# Patient Record
Sex: FEMALE | Race: WHITE | NOT HISPANIC OR LATINO | Employment: FULL TIME | URBAN - METROPOLITAN AREA
[De-identification: names, ages, dates, MRNs, and addresses within clinical notes are randomized per-mention and may not be internally consistent; named-entity substitution may affect disease eponyms.]

---

## 2017-01-10 ENCOUNTER — ALLSCRIPTS OFFICE VISIT (OUTPATIENT)
Dept: OTHER | Facility: OTHER | Age: 37
End: 2017-01-10

## 2017-01-11 LAB
HPV 18 (HISTORICAL): NOT DETECTED
HPV HIGH RISK 16/18 (HISTORICAL): NOT DETECTED
HPV16 (HISTORICAL): NOT DETECTED
PAP (HISTORICAL): NORMAL

## 2017-08-10 ENCOUNTER — APPOINTMENT (OUTPATIENT)
Dept: LAB | Facility: CLINIC | Age: 37
End: 2017-08-10
Payer: COMMERCIAL

## 2017-08-10 DIAGNOSIS — E13.9 MATURITY ONSET DIABETES MELLITUS IN YOUNG (HCC): Primary | ICD-10-CM

## 2017-08-10 LAB
ALBUMIN SERPL BCP-MCNC: 3.6 G/DL (ref 3.5–5)
ALP SERPL-CCNC: 52 U/L (ref 46–116)
ALT SERPL W P-5'-P-CCNC: 30 U/L (ref 12–78)
ANION GAP SERPL CALCULATED.3IONS-SCNC: 6 MMOL/L (ref 4–13)
AST SERPL W P-5'-P-CCNC: 19 U/L (ref 5–45)
BILIRUB SERPL-MCNC: 0.45 MG/DL (ref 0.2–1)
BUN SERPL-MCNC: 10 MG/DL (ref 5–25)
CALCIUM SERPL-MCNC: 8.6 MG/DL (ref 8.3–10.1)
CHLORIDE SERPL-SCNC: 105 MMOL/L (ref 100–108)
CHOLEST SERPL-MCNC: 186 MG/DL (ref 50–200)
CO2 SERPL-SCNC: 28 MMOL/L (ref 21–32)
CREAT SERPL-MCNC: 0.85 MG/DL (ref 0.6–1.3)
ERYTHROCYTE [DISTWIDTH] IN BLOOD BY AUTOMATED COUNT: 12.2 % (ref 11.6–15.1)
EST. AVERAGE GLUCOSE BLD GHB EST-MCNC: 183 MG/DL
GFR SERPL CREATININE-BSD FRML MDRD: 88 ML/MIN/1.73SQ M
GLUCOSE P FAST SERPL-MCNC: 181 MG/DL (ref 65–99)
HBA1C MFR BLD: 8 % (ref 4.2–6.3)
HCT VFR BLD AUTO: 40.8 % (ref 34.8–46.1)
HDLC SERPL-MCNC: 46 MG/DL (ref 40–60)
HGB BLD-MCNC: 13.9 G/DL (ref 11.5–15.4)
LDLC SERPL CALC-MCNC: 106 MG/DL (ref 0–100)
MCH RBC QN AUTO: 31.6 PG (ref 26.8–34.3)
MCHC RBC AUTO-ENTMCNC: 34.1 G/DL (ref 31.4–37.4)
MCV RBC AUTO: 93 FL (ref 82–98)
PLATELET # BLD AUTO: 204 THOUSANDS/UL (ref 149–390)
PMV BLD AUTO: 11 FL (ref 8.9–12.7)
POTASSIUM SERPL-SCNC: 4.2 MMOL/L (ref 3.5–5.3)
PROT SERPL-MCNC: 7.1 G/DL (ref 6.4–8.2)
RBC # BLD AUTO: 4.4 MILLION/UL (ref 3.81–5.12)
SODIUM SERPL-SCNC: 139 MMOL/L (ref 136–145)
TRIGL SERPL-MCNC: 171 MG/DL
WBC # BLD AUTO: 7.06 THOUSAND/UL (ref 4.31–10.16)

## 2017-08-10 PROCEDURE — 83036 HEMOGLOBIN GLYCOSYLATED A1C: CPT

## 2017-08-10 PROCEDURE — 36415 COLL VENOUS BLD VENIPUNCTURE: CPT

## 2017-08-10 PROCEDURE — 85027 COMPLETE CBC AUTOMATED: CPT

## 2017-08-10 PROCEDURE — 80053 COMPREHEN METABOLIC PANEL: CPT

## 2017-08-10 PROCEDURE — 80061 LIPID PANEL: CPT

## 2018-01-13 VITALS
HEIGHT: 69 IN | BODY MASS INDEX: 33.47 KG/M2 | SYSTOLIC BLOOD PRESSURE: 146 MMHG | WEIGHT: 226 LBS | DIASTOLIC BLOOD PRESSURE: 94 MMHG

## 2018-02-27 ENCOUNTER — APPOINTMENT (OUTPATIENT)
Dept: LAB | Facility: CLINIC | Age: 38
End: 2018-02-27
Payer: COMMERCIAL

## 2018-02-27 DIAGNOSIS — E13.9 MATURITY ONSET DIABETES MELLITUS IN YOUNG (HCC): Primary | ICD-10-CM

## 2018-02-27 LAB
EST. AVERAGE GLUCOSE BLD GHB EST-MCNC: 183 MG/DL
HBA1C MFR BLD: 8 % (ref 4.2–6.3)

## 2018-02-27 PROCEDURE — 36415 COLL VENOUS BLD VENIPUNCTURE: CPT

## 2018-02-27 PROCEDURE — 83036 HEMOGLOBIN GLYCOSYLATED A1C: CPT

## 2018-04-24 ENCOUNTER — ANNUAL EXAM (OUTPATIENT)
Dept: OBGYN CLINIC | Facility: CLINIC | Age: 38
End: 2018-04-24
Payer: COMMERCIAL

## 2018-04-24 VITALS
HEIGHT: 69 IN | DIASTOLIC BLOOD PRESSURE: 94 MMHG | BODY MASS INDEX: 34.51 KG/M2 | SYSTOLIC BLOOD PRESSURE: 142 MMHG | WEIGHT: 233 LBS

## 2018-04-24 DIAGNOSIS — N89.8 VAGINAL ITCHING: ICD-10-CM

## 2018-04-24 DIAGNOSIS — N91.2 AMENORRHEA: ICD-10-CM

## 2018-04-24 DIAGNOSIS — Z01.419 ENCOUNTER FOR GYNECOLOGICAL EXAMINATION WITHOUT ABNORMAL FINDING: Primary | ICD-10-CM

## 2018-04-24 PROCEDURE — 99395 PREV VISIT EST AGE 18-39: CPT | Performed by: PHYSICIAN ASSISTANT

## 2018-04-24 RX ORDER — GLIMEPIRIDE 1 MG/1
1 TABLET ORAL
COMMUNITY
Start: 2017-09-05 | End: 2019-05-22 | Stop reason: ALTCHOICE

## 2018-04-24 NOTE — PROGRESS NOTES
Assessment/Plan   Diagnoses and all orders for this visit:    Encounter for gynecological examination without abnormal finding    Amenorrhea  -     Progesterone; Future  -     hCG, quantitative; Future  -     TSH, 3rd generation; Future  -     T4, free; Future    Vaginal itching  -     GP Culture, Genital    Other orders  -     glimepiride (AMARYL) 1 mg tablet; Take 1 mg by mouth        Discussion  I have discussed the importance of monthly self-breast exams, exercise and healthy diet as well as adequate intake of calcium and vitamin D  Encourage MVI q day and r/gera importance of folic acid; Encourage 30-40 min weight bearing exercise most days of week  Encourage safe sexual practices; STD testing -   Contraception -   The current ASCCP guidelines were reviewed  The low risk patient will receive pap smear screening every 3 years or pap with HPV co-testing every 5 years  High risk patients will be triaged based on previous pap smear results, which have been reviewed; I emphasized the importance of an annual pelvic and breast exam  Patient opts to have a pap done today  Breast cancer screening is not indicated at this time  All questions have been answered to her satisfaction  RTO for APE or sooner if needed      Subjective     Pt for annual GYN exam  Periods sporadic  Last period approx 7 mths ago from what she can remember, very light bleeding  R/w pt concerns w oligomenorrhea  Periods were normal initially after delivery then fell off and now very sporadic  Pt does have h/o PCOS  Does c/o worsening hirsuitism lately  Does c/o vaginal itching and irritation off and on, itching is significant  Does currently have sx  Uses vaginal cream once a day and sx resolve, then recur few weeks later          Ramo Hernandez is a 40 y o  female who presents for annual well woman exam    Menarche -15 ; LMP -few mths ago 10/17  (+) SBEs - no breast masses, asymmetry, nipple discharge or bleeding, changes in skin of breast, or breast tenderness bilaterally  No abd/pelvic pain or HAs;   Pt is sexually active in a mutually monog/ sexual relationship; No issues with intercourse; She declines std/hiv/hep testing; Feels safe at home  Current contraception: partner w male sterility  (+) PCP for routine Bw/care; Last Pap -  WNL neg HPV deferred today  History of abnormal Pap smear:     Review of Systems   Constitutional: Negative  Respiratory: Negative  Gastrointestinal: Negative  Endocrine: Negative  Genitourinary: Positive for menstrual problem  The following portions of the patient's history were reviewed and updated as appropriate: current medications, past family history, past medical history, past social history, past surgical history and problem list          OB History      Para Term  AB Living    3 3            SAB TAB Ectopic Multiple Live Births                     Obstetric Comments    C/s x1 ; 2009-LTCS secondary nuchal cord ; complicated [id number]; [id number]-[id number]-C/E, A2GDM and pregnancy comments: 2008-cf negative           Past Medical History:   Diagnosis Date    Diabetes type 2, controlled ([address] )     Oligomenorrhea     PCOS (polycystic ovarian syndrome)        Past Surgical History:   Procedure Laterality Date     SECTION, LOW TRANSVERSE      WISDOM TOOTH EXTRACTION         Family History   Problem Relation Age of Onset    Stroke Mother     Hypertension Mother     Diabetes Paternal Grandmother     Lung cancer Paternal Grandmother        Social History     Social History    Marital status: /Civil Union     Spouse name: N/A    Number of children: N/A    Years of education: N/A     Occupational History    Not on file       Social History Main Topics    Smoking status: Former Smoker    Smokeless tobacco: Not on file    Alcohol use Yes      Comment: social     Drug use: Unknown    Sexual activity: Not on file     Other Topics Concern    Not on file Social History Narrative    Monogamous relationship          Current Outpatient Prescriptions:     glimepiride (AMARYL) 1 mg tablet, Take 1 mg by mouth, Disp: , Rfl:     Allergies   Allergen Reactions    Naproxen        Objective   Vitals:    04/24/18 1114   BP: 142/94   BP Location: Left arm   Patient Position: Sitting   Cuff Size: Adult   Weight: 106 kg (233 lb)   Height: 5' 9" (1 753 m)     Physical Exam   Constitutional: She is oriented to person, place, and time  She appears well-developed and well-nourished  HENT:   Head: Normocephalic and atraumatic  Cardiovascular: Normal rate and regular rhythm  Pulmonary/Chest: Effort normal and breath sounds normal  Right breast exhibits no inverted nipple, no mass, no nipple discharge, no skin change and no tenderness  Left breast exhibits no inverted nipple, no mass, no nipple discharge, no skin change and no tenderness  Breasts are symmetrical    Abdominal: Soft  She exhibits no distension and no mass  There is no rebound and no guarding  Genitourinary: No breast swelling, tenderness, discharge or bleeding  There is erythema in the vagina  Vaginal discharge found  Genitourinary Comments: Moderate amount of external erythema noted around rim of labia majora and mostly around superior vagina  No lesions noted, no excoriations but moderate erythema noted and some patches of hypopigmentation  C/w LSA    Neurological: She is alert and oriented to person, place, and time  Skin: Skin is warm and dry  Psychiatric: She has a normal mood and affect  Patient Instructions   Will plan BW to evaluate for ovulation  If no signs of ovulation will plan Provera withdrawal   Pt then desires to watch over the next few mths and see what happens w cycles  If no menses after 3 mths will redo Provera withdrawal and consider other options to make cycles more regular and minimize PCOS sx

## 2018-04-24 NOTE — PATIENT INSTRUCTIONS
Will plan BW to evaluate for ovulation  If no signs of ovulation will plan Provera withdrawal   Pt then desires to watch over the next few mths and see what happens w cycles  If no menses after 3 mths will redo Provera withdrawal and consider other options to make cycles more regular and minimize PCOS sx

## 2018-04-27 ENCOUNTER — APPOINTMENT (OUTPATIENT)
Dept: LAB | Facility: CLINIC | Age: 38
End: 2018-04-27
Payer: COMMERCIAL

## 2018-04-27 DIAGNOSIS — N91.2 AMENORRHEA: ICD-10-CM

## 2018-04-27 LAB
B-HCG SERPL-ACNC: <2 MIU/ML
PROGEST SERPL-MCNC: 0.3 NG/ML
SL AMB GENITAL CULTURE: NORMAL
T4 FREE SERPL-MCNC: 1.04 NG/DL (ref 0.76–1.46)
TSH SERPL DL<=0.05 MIU/L-ACNC: 1.71 UIU/ML (ref 0.36–3.74)

## 2018-04-27 PROCEDURE — 84702 CHORIONIC GONADOTROPIN TEST: CPT

## 2018-04-27 PROCEDURE — 84439 ASSAY OF FREE THYROXINE: CPT

## 2018-04-27 PROCEDURE — 84144 ASSAY OF PROGESTERONE: CPT

## 2018-04-27 PROCEDURE — 36415 COLL VENOUS BLD VENIPUNCTURE: CPT

## 2018-04-27 PROCEDURE — 84443 ASSAY THYROID STIM HORMONE: CPT

## 2018-05-01 DIAGNOSIS — N91.5 OLIGOMENORRHEA, UNSPECIFIED TYPE: Primary | ICD-10-CM

## 2018-05-01 RX ORDER — MEDROXYPROGESTERONE ACETATE 10 MG/1
10 TABLET ORAL DAILY
Qty: 10 TABLET | Refills: 0 | Status: SHIPPED | OUTPATIENT
Start: 2018-05-01 | End: 2019-05-22 | Stop reason: ALTCHOICE

## 2018-05-17 ENCOUNTER — TELEPHONE (OUTPATIENT)
Dept: OBGYN CLINIC | Facility: CLINIC | Age: 38
End: 2018-05-17

## 2018-05-17 NOTE — TELEPHONE ENCOUNTER
Spoke with patient  Stated she took Provera then started bleeding 2 days ago which bleeding was normal first day then the last two days bleeding was really heavy  Going through a super plus tampon every 1-2 hours  Was calling to see if normal? Reviewed may have heavier menses due to no menses for 7 months and withdraw bleed from provera  Advised can try 600mg Motrin Q 6 hours to help with heavy bleeding or can start on a birth control pill  When reviewing risks of clotting with OCP due to patient age and elevated blood pressure patient declined at this time and stated she will try the Motrin first to see if that works  Pt denies any lightheadedness or dizziness  Reviewed with patient if bleeding gets worse or she does not feel better to call us back or head to the ER  Pt states will continue to evaluate

## 2018-06-13 ENCOUNTER — TELEPHONE (OUTPATIENT)
Dept: OBGYN CLINIC | Facility: CLINIC | Age: 38
End: 2018-06-13

## 2018-06-21 NOTE — TELEPHONE ENCOUNTER
Spoke with patient about bleeding  Pt states took Provera like Jose Miller had prescribed to bring on menses as had not had menses in over 6 months  Began bleeding day after stopped provera  Bleeding lasted 7-8 days with 4-5 days of very heavy menses  Bleeding stopped for a whole week  Then had spotting until she started her current menses this week  Pt states was sent for lab work and was told all WNL  Does have a h/o PCOS   Options reviewed by Jose Miller to help with hirsutism and irregular menses  OCP discussed per patient  Pt would like to see if bleeding regulates on its own if not would like to go on OCP  Reviewed with patient will speak with Jose Miller prior to prescribing due to hx of HTN not currently on any medication, and unsure how far Jose Miller had discussed OCP with patient

## 2018-06-29 NOTE — TELEPHONE ENCOUNTER
LMOM was going to review with patient recommendation of cyclic progesterone, and pt to see endocrine to discuss options for hirsutism treatment

## 2018-07-05 NOTE — TELEPHONE ENCOUNTER
Spoke with patient recommend following up with endocrine for treatment for hirsutism  Advised cyclic progesterone to allow her to have regular monthly cycles  Pt states she recently had a menses on her own on June 15th but is now having some spotting in between  This is the second month this has happened  Reviewed with patient she does not need cyclic progesterone if she is getting menses on her own  Recommend continuing to follow menses and if continues to have intermenstrual bleeding to call office for follow up

## 2019-01-10 NOTE — TELEPHONE ENCOUNTER
Would you have time to call and review s/sx with her  I dont know a lot about prometrium to help bring on period   thanks no

## 2019-05-22 ENCOUNTER — ANNUAL EXAM (OUTPATIENT)
Dept: OBGYN CLINIC | Facility: CLINIC | Age: 39
End: 2019-05-22
Payer: COMMERCIAL

## 2019-05-22 VITALS
SYSTOLIC BLOOD PRESSURE: 120 MMHG | WEIGHT: 209 LBS | HEIGHT: 69 IN | DIASTOLIC BLOOD PRESSURE: 80 MMHG | BODY MASS INDEX: 30.96 KG/M2

## 2019-05-22 DIAGNOSIS — N92.1 MENORRHAGIA WITH IRREGULAR CYCLE: ICD-10-CM

## 2019-05-22 DIAGNOSIS — Z01.419 ENCOUNTER FOR GYNECOLOGICAL EXAMINATION WITHOUT ABNORMAL FINDING: Primary | ICD-10-CM

## 2019-05-22 PROCEDURE — 99395 PREV VISIT EST AGE 18-39: CPT | Performed by: PHYSICIAN ASSISTANT

## 2019-05-22 RX ORDER — LISINOPRIL 5 MG/1
TABLET ORAL
Refills: 1 | COMMUNITY
Start: 2019-05-15

## 2019-05-22 RX ORDER — EMPAGLIFLOZIN 25 MG/1
TABLET, FILM COATED ORAL
Refills: 0 | COMMUNITY
Start: 2019-05-10 | End: 2021-05-19

## 2019-05-22 RX ORDER — ATORVASTATIN CALCIUM 20 MG/1
20 TABLET, FILM COATED ORAL EVERY EVENING
Refills: 0 | COMMUNITY
Start: 2019-03-12

## 2019-05-22 RX ORDER — ACARBOSE 25 MG/1
TABLET ORAL
Refills: 1 | COMMUNITY
Start: 2019-05-12

## 2019-05-26 LAB — SL AMB GENITAL CULTURE: ABNORMAL

## 2019-06-01 ENCOUNTER — HOSPITAL ENCOUNTER (OUTPATIENT)
Dept: RADIOLOGY | Facility: HOSPITAL | Age: 39
Discharge: HOME | End: 2019-06-01
Payer: COMMERCIAL

## 2019-06-01 DIAGNOSIS — N92.1 MENORRHAGIA WITH IRREGULAR CYCLE: ICD-10-CM

## 2019-06-01 PROCEDURE — 76856 US EXAM PELVIC COMPLETE: CPT

## 2019-06-01 PROCEDURE — 76830 TRANSVAGINAL US NON-OB: CPT

## 2019-06-04 LAB
BASOPHILS # BLD AUTO: 0.1 X10E3/UL (ref 0–0.2)
BASOPHILS NFR BLD AUTO: 1 %
EOSINOPHIL # BLD AUTO: 0.2 X10E3/UL (ref 0–0.4)
EOSINOPHIL NFR BLD AUTO: 2 %
ERYTHROCYTE [DISTWIDTH] IN BLOOD BY AUTOMATED COUNT: 12.6 % (ref 12.3–15.4)
HCT VFR BLD AUTO: 43.2 % (ref 34–46.6)
HGB BLD-MCNC: 14.4 G/DL (ref 11.1–15.9)
IMM GRANULOCYTES # BLD: 0 X10E3/UL (ref 0–0.1)
IMM GRANULOCYTES NFR BLD: 0 %
LYMPHOCYTES # BLD AUTO: 1.9 X10E3/UL (ref 0.7–3.1)
LYMPHOCYTES NFR BLD AUTO: 26 %
MCH RBC QN AUTO: 31.4 PG (ref 26.6–33)
MCHC RBC AUTO-ENTMCNC: 33.3 G/DL (ref 31.5–35.7)
MCV RBC AUTO: 94 FL (ref 79–97)
MONOCYTES # BLD AUTO: 0.6 X10E3/UL (ref 0.1–0.9)
MONOCYTES NFR BLD AUTO: 8 %
NEUTROPHILS # BLD AUTO: 4.7 X10E3/UL (ref 1.4–7)
NEUTROPHILS NFR BLD AUTO: 63 %
PLATELET # BLD AUTO: 222 X10E3/UL (ref 150–450)
RBC # BLD AUTO: 4.58 X10E6/UL (ref 3.77–5.28)
WBC # BLD AUTO: 7.5 X10E3/UL (ref 3.4–10.8)

## 2019-06-20 ENCOUNTER — TELEPHONE (OUTPATIENT)
Dept: OBGYN CLINIC | Facility: CLINIC | Age: 39
End: 2019-06-20

## 2019-07-18 ENCOUNTER — OFFICE VISIT (OUTPATIENT)
Dept: OBGYN CLINIC | Facility: CLINIC | Age: 39
End: 2019-07-18
Payer: COMMERCIAL

## 2019-07-18 VITALS
BODY MASS INDEX: 30.45 KG/M2 | SYSTOLIC BLOOD PRESSURE: 112 MMHG | WEIGHT: 205.6 LBS | DIASTOLIC BLOOD PRESSURE: 78 MMHG | HEIGHT: 69 IN

## 2019-07-18 DIAGNOSIS — N92.0 MENORRHAGIA WITH REGULAR CYCLE: Primary | ICD-10-CM

## 2019-07-18 PROBLEM — Z01.419 ENCOUNTER FOR GYNECOLOGICAL EXAMINATION WITHOUT ABNORMAL FINDING: Status: RESOLVED | Noted: 2018-04-24 | Resolved: 2019-07-18

## 2019-07-18 PROCEDURE — 99214 OFFICE O/P EST MOD 30 MIN: CPT | Performed by: OBSTETRICS & GYNECOLOGY

## 2019-07-18 RX ORDER — NORETHINDRONE ACETATE AND ETHINYL ESTRADIOL AND FERROUS FUMARATE 1MG-20(24)
1 KIT ORAL DAILY
Qty: 84 TABLET | Refills: 3 | Status: SHIPPED | OUTPATIENT
Start: 2019-07-18 | End: 2020-06-29

## 2019-07-18 NOTE — PROGRESS NOTES
Assessment/Plan:    Menorrhagia - reviewed options for treatment - will try OCP  RTO 3 months for OCP check - earlier prn       Problem List Items Addressed This Visit        Other    Menorrhagia with regular cycle - Primary    Relevant Medications    norethindrone-ethinyl estradiol-ferrous fumarate (LOESTIN 24 FE) 1-20 MG-MCG(24) per tablet            Subjective:      Patient ID: Shawanda Finch is a 45 y o  female  Here to discuss possible hysterectomy - menses are regular but very heavy for 2-3 days for total of 10 days with spotting up to a week before - no dysmenorrhea - has not tried any other forms of medication - reviewed ultrasound findings with her, but since she has absolutely no dysmenorrhea I am suspicious of the adenomyosis diagnosis on the ultrasound  And a sure that with her  We discussed all options available for treatment of the menorrhagia and she is interested in trying an oral contraceptive pill at this time  We reviewed the risks and benefits associated with that and reviewed how to take the pill  We sent her prescription that she will start with her next menses  She will return to the office in 2 and half to 3 months for follow-up, earlier as needed  The following portions of the patient's history were reviewed and updated as appropriate:   She  has a past medical history of Diabetes type 2, controlled ([address] ), Oligomenorrhea, and PCOS (polycystic ovarian syndrome)  She   Patient Active Problem List    Diagnosis Date Noted    Menorrhagia with regular cycle 2019    Type II or unspecified type diabetes mellitus without mention of complication, not stated as uncontrolled 12/10/2013     She  has a past surgical history that includes  section, low transverse and [place] tooth extraction  Her family history includes Diabetes in her paternal grandmother; Hypertension in her mother; Lung cancer in her paternal grandmother; Stroke in her mother    She  reports that she has quit smoking  She does not have any smokeless tobacco history on file  She reports that she drinks alcohol  She reports that she does not use drugs  Current Outpatient Medications   Medication Sig Dispense Refill    acarbose (PRECOSE) 25 mg tablet   1    atorvastatin (LIPITOR) 20 mg tablet Take 20 mg by mouth every evening  0    JARDIANCE 25 MG TABS   0    lisinopril (ZESTRIL) 5 mg tablet   1    norethindrone-ethinyl estradiol-ferrous fumarate (LOESTIN 24 FE) 1-20 MG-MCG(24) per tablet Take 1 tablet by mouth daily for 84 days 84 tablet 3     No current facility-administered medications for this visit  Current Outpatient Medications on File Prior to Visit   Medication Sig    acarbose (PRECOSE) 25 mg tablet     atorvastatin (LIPITOR) 20 mg tablet Take 20 mg by mouth every evening    JARDIANCE 25 MG TABS     lisinopril (ZESTRIL) 5 mg tablet      No current facility-administered medications on file prior to visit  She is allergic to naproxen       Review of Systems   Constitutional: Negative for chills, fatigue, fever and unexpected weight change  HENT: Negative for dental problem, sinus pressure and sinus pain  Eyes: Negative for visual disturbance  Respiratory: Negative for cough, shortness of breath and wheezing  Cardiovascular: Negative for chest pain and leg swelling  Gastrointestinal: Negative for constipation, diarrhea, nausea and vomiting  Genitourinary: Negative for menstrual problem, pelvic pain and urgency  Musculoskeletal: Negative for back pain  Allergic/Immunologic: Negative for environmental allergies  Neurological: Negative for dizziness and headaches           Objective:      /78 (BP Location: Left arm, Patient Position: Sitting, Cuff Size: Standard)   Ht 5' 9" (1 753 m)   Wt 93 3 kg (205 lb 9 6 oz)   LMP 06/27/2019 (Approximate) Comment: GIUSEPPE discussion today   BMI 30 36 kg/m²          Physical Exam   Constitutional: She is oriented to person, place, and time  She appears well-developed and well-nourished  No distress  [facility] white female   HENT:   Head: Normocephalic and atraumatic  Neurological: She is alert and oriented to person, place, and time  Psychiatric: She has a normal mood and affect

## 2019-10-17 ENCOUNTER — OFFICE VISIT (OUTPATIENT)
Dept: OBGYN CLINIC | Facility: CLINIC | Age: 39
End: 2019-10-17
Payer: COMMERCIAL

## 2019-10-17 VITALS
DIASTOLIC BLOOD PRESSURE: 74 MMHG | BODY MASS INDEX: 29.18 KG/M2 | SYSTOLIC BLOOD PRESSURE: 122 MMHG | HEIGHT: 69 IN | WEIGHT: 197 LBS

## 2019-10-17 DIAGNOSIS — N92.0 MENORRHAGIA WITH REGULAR CYCLE: ICD-10-CM

## 2019-10-17 DIAGNOSIS — Z30.41 ENCOUNTER FOR SURVEILLANCE OF CONTRACEPTIVE PILLS: Primary | ICD-10-CM

## 2019-10-17 PROCEDURE — 99213 OFFICE O/P EST LOW 20 MIN: CPT | Performed by: OBSTETRICS & GYNECOLOGY

## 2019-10-17 NOTE — PROGRESS NOTES
Assessment/Plan:    No problem-specific Assessment & Plan notes found for this encounter  Problem List Items Addressed This Visit        Other    Menorrhagia with regular cycle    Encounter for surveillance of contraceptive pills - Primary            Subjective:      Patient ID: Ronna Scott is a 45 y o  female  Here for OCP check  - menses are regular and slightly lighter and less cramping - no problems otherwise and would like to stay on the current pill  The following portions of the patient's history were reviewed and updated as appropriate:   She  has a past medical history of Diabetes type 2, controlled ([address] ), Oligomenorrhea, and PCOS (polycystic ovarian syndrome)  She   Patient Active Problem List    Diagnosis Date Noted    Encounter for surveillance of contraceptive pills 10/17/2019    Menorrhagia with regular cycle 2019    Type II or unspecified type diabetes mellitus without mention of complication, not stated as uncontrolled 12/10/2013     She  has a past surgical history that includes  section, low transverse and [place] tooth extraction  Her family history includes Diabetes in her paternal grandmother; Hypertension in her mother; Lung cancer in her paternal grandmother; Stroke in her mother  She  reports that she has quit smoking  She has never used smokeless tobacco  She reports that she drinks alcohol  She reports that she does not use drugs  Current Outpatient Medications   Medication Sig Dispense Refill    acarbose (PRECOSE) 25 mg tablet   1    atorvastatin (LIPITOR) 20 mg tablet Take 20 mg by mouth every evening  0    JARDIANCE 25 MG TABS   0    lisinopril (ZESTRIL) 5 mg tablet   1    norethindrone-ethinyl estradiol-ferrous fumarate (LOESTIN 24 FE) 1-20 MG-MCG(24) per tablet Take 1 tablet by mouth daily for 84 days 84 tablet 3     No current facility-administered medications for this visit        Current Outpatient Medications on File Prior to Visit Medication Sig    acarbose (PRECOSE) 25 mg tablet     atorvastatin (LIPITOR) 20 mg tablet Take 20 mg by mouth every evening    JARDIANCE 25 MG TABS     lisinopril (ZESTRIL) 5 mg tablet     norethindrone-ethinyl estradiol-ferrous fumarate (LOESTIN 24 FE) 1-20 MG-MCG(24) per tablet Take 1 tablet by mouth daily for 84 days     No current facility-administered medications on file prior to visit  She is allergic to naproxen       Review of Systems   Constitutional: Negative for chills, fatigue, fever and unexpected weight change  HENT: Negative for dental problem, sinus pressure and sinus pain  Eyes: Negative for visual disturbance  Respiratory: Negative for cough, shortness of breath and wheezing  Cardiovascular: Negative for chest pain and leg swelling  Gastrointestinal: Negative for constipation, diarrhea, nausea and vomiting  Genitourinary: Negative for urgency  Musculoskeletal: Negative for back pain and joint swelling  Allergic/Immunologic: Negative for environmental allergies  Neurological: Negative for dizziness and headaches  Psychiatric/Behavioral: The patient is not nervous/anxious  Objective:      /74 (BP Location: Left arm, Patient Position: Sitting, Cuff Size: Standard)   Ht 5' 9" (1 753 m)   Wt 89 4 kg (197 lb)   LMP 10/01/2019 (Exact Date)   BMI 29 09 kg/m²          Physical Exam   Constitutional: She is oriented to person, place, and time  She appears well-developed and well-nourished  No distress  Young white female    Neurological: She is alert and oriented to person, place, and time  Psychiatric: She has a normal mood and affect  Vitals reviewed

## 2020-06-29 DIAGNOSIS — N92.0 MENORRHAGIA WITH REGULAR CYCLE: ICD-10-CM

## 2020-06-29 RX ORDER — NORETHINDRONE ACETATE/ETHINYL ESTRADIOL AND FERROUS FUMARATE 1MG-20(24)
KIT ORAL
Qty: 84 TABLET | Refills: 3 | Status: SHIPPED | OUTPATIENT
Start: 2020-06-29 | End: 2021-03-08 | Stop reason: SDUPTHER

## 2021-03-08 ENCOUNTER — TELEPHONE (OUTPATIENT)
Dept: OBGYN CLINIC | Facility: CLINIC | Age: 41
End: 2021-03-08

## 2021-03-08 DIAGNOSIS — N92.0 MENORRHAGIA WITH REGULAR CYCLE: ICD-10-CM

## 2021-03-09 RX ORDER — NORETHINDRONE ACETATE/ETHINYL ESTRADIOL AND FERROUS FUMARATE 1MG-20(24)
1 KIT ORAL DAILY
Qty: 84 TABLET | Refills: 0 | Status: SHIPPED | OUTPATIENT
Start: 2021-03-09 | End: 2021-05-19 | Stop reason: SDUPTHER

## 2021-05-19 ENCOUNTER — ANNUAL EXAM (OUTPATIENT)
Dept: OBGYN CLINIC | Facility: CLINIC | Age: 41
End: 2021-05-19
Payer: COMMERCIAL

## 2021-05-19 VITALS
SYSTOLIC BLOOD PRESSURE: 136 MMHG | DIASTOLIC BLOOD PRESSURE: 84 MMHG | HEIGHT: 69 IN | BODY MASS INDEX: 31.37 KG/M2 | WEIGHT: 211.8 LBS

## 2021-05-19 DIAGNOSIS — Z12.31 ENCOUNTER FOR SCREENING MAMMOGRAM FOR MALIGNANT NEOPLASM OF BREAST: ICD-10-CM

## 2021-05-19 DIAGNOSIS — Z01.419 ROUTINE GYNECOLOGICAL EXAMINATION: Primary | ICD-10-CM

## 2021-05-19 DIAGNOSIS — Z13.220 SCREENING FOR LIPID DISORDERS: ICD-10-CM

## 2021-05-19 DIAGNOSIS — N92.0 MENORRHAGIA WITH REGULAR CYCLE: ICD-10-CM

## 2021-05-19 DIAGNOSIS — E28.2 PCOS (POLYCYSTIC OVARIAN SYNDROME): ICD-10-CM

## 2021-05-19 DIAGNOSIS — E11.9 TYPE 2 DIABETES MELLITUS WITHOUT COMPLICATION, WITHOUT LONG-TERM CURRENT USE OF INSULIN (HCC): ICD-10-CM

## 2021-05-19 PROBLEM — I10 ESSENTIAL HYPERTENSION: Status: ACTIVE | Noted: 2020-03-31

## 2021-05-19 PROBLEM — E78.5 HYPERLIPIDEMIA: Status: ACTIVE | Noted: 2020-03-31

## 2021-05-19 PROCEDURE — S0612 ANNUAL GYNECOLOGICAL EXAMINA: HCPCS | Performed by: PHYSICIAN ASSISTANT

## 2021-05-19 RX ORDER — NORETHINDRONE ACETATE/ETHINYL ESTRADIOL AND FERROUS FUMARATE 1MG-20(24)
1 KIT ORAL DAILY
Qty: 84 TABLET | Refills: 3 | Status: SHIPPED | OUTPATIENT
Start: 2021-05-19 | End: 2022-05-24 | Stop reason: ALTCHOICE

## 2021-05-22 LAB
CYTOLOGIST CVX/VAG CYTO: ABNORMAL
DX ICD CODE: ABNORMAL
HPV I/H RISK 4 DNA CVX QL PROBE+SIG AMP: POSITIVE
HPV16 DNA CVX QL PROBE+SIG AMP: NEGATIVE
HPV18+45 E6+E7 MRNA CVX QL NAA+PROBE: NEGATIVE
OTHER STN SPEC: ABNORMAL
PATH REPORT.FINAL DX SPEC: ABNORMAL
SL AMB NOTE:: ABNORMAL
SL AMB SPECIMEN ADEQUACY: ABNORMAL
SL AMB TEST METHODOLOGY: ABNORMAL

## 2021-05-26 LAB
ALBUMIN SERPL-MCNC: 4 G/DL (ref 3.8–4.8)
ALBUMIN/GLOB SERPL: 1.5 {RATIO} (ref 1.2–2.2)
ALP SERPL-CCNC: 42 IU/L (ref 48–121)
ALT SERPL-CCNC: 31 IU/L (ref 0–32)
AST SERPL-CCNC: 37 IU/L (ref 0–40)
BASOPHILS # BLD AUTO: 0.1 X10E3/UL (ref 0–0.2)
BASOPHILS NFR BLD AUTO: 1 %
BILIRUB SERPL-MCNC: 0.4 MG/DL (ref 0–1.2)
BUN SERPL-MCNC: 14 MG/DL (ref 6–24)
BUN/CREAT SERPL: 18 (ref 9–23)
CALCIUM SERPL-MCNC: 8.9 MG/DL (ref 8.7–10.2)
CHLORIDE SERPL-SCNC: 105 MMOL/L (ref 96–106)
CHOLEST SERPL-MCNC: 130 MG/DL (ref 100–199)
CHOLEST/HDLC SERPL: 2.6 RATIO (ref 0–4.4)
CO2 SERPL-SCNC: 23 MMOL/L (ref 20–29)
CREAT SERPL-MCNC: 0.77 MG/DL (ref 0.57–1)
EOSINOPHIL # BLD AUTO: 0.2 X10E3/UL (ref 0–0.4)
EOSINOPHIL NFR BLD AUTO: 3 %
ERYTHROCYTE [DISTWIDTH] IN BLOOD BY AUTOMATED COUNT: 12.1 % (ref 11.7–15.4)
EST. AVERAGE GLUCOSE BLD GHB EST-MCNC: 174 MG/DL
GLOBULIN SER-MCNC: 2.6 G/DL (ref 1.5–4.5)
GLUCOSE SERPL-MCNC: 199 MG/DL (ref 65–99)
HBA1C MFR BLD: 7.7 % (ref 4.8–5.6)
HCT VFR BLD AUTO: 39.5 % (ref 34–46.6)
HDLC SERPL-MCNC: 50 MG/DL
HGB BLD-MCNC: 13.9 G/DL (ref 11.1–15.9)
IMM GRANULOCYTES # BLD: 0 X10E3/UL (ref 0–0.1)
IMM GRANULOCYTES NFR BLD: 0 %
LDLC SERPL CALC-MCNC: 54 MG/DL (ref 0–99)
LYMPHOCYTES # BLD AUTO: 1.8 X10E3/UL (ref 0.7–3.1)
LYMPHOCYTES NFR BLD AUTO: 21 %
MCH RBC QN AUTO: 32.5 PG (ref 26.6–33)
MCHC RBC AUTO-ENTMCNC: 35.2 G/DL (ref 31.5–35.7)
MCV RBC AUTO: 92 FL (ref 79–97)
MONOCYTES # BLD AUTO: 0.8 X10E3/UL (ref 0.1–0.9)
MONOCYTES NFR BLD AUTO: 9 %
NEUTROPHILS # BLD AUTO: 5.7 X10E3/UL (ref 1.4–7)
NEUTROPHILS NFR BLD AUTO: 66 %
PLATELET # BLD AUTO: 222 X10E3/UL (ref 150–450)
POTASSIUM SERPL-SCNC: 5 MMOL/L (ref 3.5–5.2)
PROT SERPL-MCNC: 6.6 G/DL (ref 6–8.5)
RBC # BLD AUTO: 4.28 X10E6/UL (ref 3.77–5.28)
SL AMB EGFR AFRICAN AMERICAN: 112 ML/MIN/1.73
SL AMB EGFR NON AFRICAN AMERICAN: 97 ML/MIN/1.73
SL AMB VLDL CHOLESTEROL CALC: 26 MG/DL (ref 5–40)
SODIUM SERPL-SCNC: 138 MMOL/L (ref 134–144)
T4 FREE SERPL-MCNC: 1.22 NG/DL (ref 0.82–1.77)
TRIGL SERPL-MCNC: 156 MG/DL (ref 0–149)
TSH SERPL DL<=0.005 MIU/L-ACNC: 1.88 UIU/ML (ref 0.45–4.5)
WBC # BLD AUTO: 8.6 X10E3/UL (ref 3.4–10.8)

## 2021-05-28 RX ORDER — ATORVASTATIN CALCIUM 20 MG/1
20 TABLET, FILM COATED ORAL DAILY
COMMUNITY
Start: 2021-05-21 | End: 2021-06-20

## 2021-05-28 NOTE — PROGRESS NOTES
Assessment/Plan   Problem List Items Addressed This Visit        Other    Menorrhagia with regular cycle    Relevant Medications    norethindrone-ethinyl estradiol-ferrous fumarate (Margaux 24 FE) 1-20 MG-MCG(24) per tablet    Other Relevant Orders    CBC and differential (Completed)    T4, free (Completed)    TSH, 3rd generation (Completed)    Routine gynecological examination - Primary     Pap guidelines reviewed  Pap with HPV done today  Will plan to continue Margaux 24 Fe  Script renewed to pharmacy  Script for routine blood work given including TFTs, CBC, CMP, Lipid panel, and Hemoglobin A1C  Encouraged patient to also follow up with PCP for diabetes management  Reviewed mammogram guidelines  Script for mammogram given  Return to office for annual or as needed  Relevant Orders    IGP, Aptima HPV, Rfx 16/18,45 (Completed)      Other Visit Diagnoses     Encounter for screening mammogram for malignant neoplasm of breast        Relevant Orders    Mammo screening bilateral w 3d & cad    PCOS (polycystic ovarian syndrome)        Relevant Orders    CBC and differential (Completed)    Comprehensive metabolic panel (Completed)    Lipid panel (Completed)    T4, free (Completed)    TSH, 3rd generation (Completed)    Hemoglobin A1C (Completed)    Type 2 diabetes mellitus without complication, without long-term current use of insulin (HCC)        Relevant Orders    CBC and differential (Completed)    Comprehensive metabolic panel (Completed)    Lipid panel (Completed)    T4, free (Completed)    TSH, 3rd generation (Completed)    Hemoglobin A1C (Completed)    Screening for lipid disorders        Relevant Orders    Lipid panel (Completed)          Subjective:     Patient ID: Isaak Dick is a 36 y o  y o  female  HPI  35 yo seen for annual exam  Currently on Margaux 24 Fe  Menses regular, heavy at times  Hx of PCOS as well as type 2 diabetes currently on Acarbose   Has not had routine blood work done in a few years  Denies bowel or bladder issues  Last pap: 1/10/2017 NILM (-)HRHPV  Last mammogram: Has not had done yet  The following portions of the patient's history were reviewed and updated as appropriate:   She  has a past medical history of Diabetes type 2, controlled ([facility] [address] ), Oligomenorrhea, and PCOS (polycystic ovarian syndrome)  She   Patient Active Problem List    Diagnosis Date Noted    Routine gynecological examination 2021    Essential hypertension 2020    Hyperlipidemia 2020    Diabetes mellitus without complication ([facility] [address] )     Encounter for surveillance of contraceptive pills 10/17/2019    Menorrhagia with regular cycle 2019    Type II or unspecified type diabetes mellitus without mention of complication, not stated as uncontrolled 12/10/2013     She  has a past surgical history that includes  section, low transverse and [place] tooth extraction  Her family history includes Diabetes in her paternal grandmother; Hypertension in her mother; Lung cancer in her paternal grandmother; Stroke in her mother  She  reports that she has quit smoking  She has never used smokeless tobacco  She reports current alcohol use  She reports that she does not use drugs  Current Outpatient Medications   Medication Sig Dispense Refill    acarbose (PRECOSE) 25 mg tablet   1    atorvastatin (LIPITOR) 20 mg tablet Take 20 mg by mouth every evening  0    atorvastatin (LIPITOR) 20 mg tablet Take 20 mg by mouth daily      lisinopril (ZESTRIL) 5 mg tablet   1    norethindrone-ethinyl estradiol-ferrous fumarate (Margaux 24 FE) 1-20 MG-MCG(24) per tablet Take 1 tablet by mouth daily 84 tablet 3     No current facility-administered medications for this visit  She is allergic to naproxen       Menstrual History:  OB History        2    Para   2    Term   2            AB        Living   3       SAB        TAB        Ectopic        Multiple   1    Live Births   3 Obstetric Comments   C/s x1 ; 2009-LTCS secondary nuchal cord ; complicated [id number]; [id number]-R/W, A2GDM and pregnancy comments: 2008-cf negative               Patient's last menstrual period was 05/07/2021 (exact date)  Review of Systems   Constitutional: Negative for fatigue, fever and unexpected weight change  HENT: Negative for dental problem and sinus pressure  Eyes: Negative for visual disturbance  Respiratory: Negative for cough, shortness of breath and wheezing  Cardiovascular: Negative for chest pain  Gastrointestinal: Negative for abdominal pain, blood in stool, constipation, diarrhea, nausea and vomiting  Endocrine: Negative for polydipsia  Genitourinary: Negative for difficulty urinating, dyspareunia, dysuria, frequency, hematuria, pelvic pain and urgency  Musculoskeletal: Negative for arthralgias and back pain  Neurological: Negative for dizziness, seizures, light-headedness and headaches  Psychiatric/Behavioral: Negative for suicidal ideas  The patient is not nervous/anxious  Objective:  Vitals:    05/19/21 1054   BP: 136/84   BP Location: Left arm   Patient Position: Sitting   Cuff Size: Standard   Weight: 96 1 kg (211 lb 12 8 oz)   Height: 5' 9" (1 753 m)      Physical Exam  Constitutional:       Appearance: Normal appearance  She is well-developed  Genitourinary:      Vulva, urethra, bladder, vagina and uterus normal       No vulval condylomata, lesion, tenderness, ulcerations, Bartholin's cyst or rash noted  No signs of labial injury  No labial fusion  No inguinal adenopathy present in the right or left side  No urethral prolapse, pain, swelling, tenderness, caruncle, mass or diverticulum present  Bladder is not distended or tender  No signs of injury or lesions in the vagina  No vaginal discharge, erythema, tenderness or bleeding  No cervical motion tenderness, discharge or lesion        Uterus is not enlarged, tender, irregular or mobile  No uterine mass detected  No right or left adnexal mass present  Right adnexa not tender or full  Left adnexa not tender or full  HENT:      Head: Normocephalic and atraumatic  Neck:      Thyroid: No thyromegaly  Cardiovascular:      Rate and Rhythm: Normal rate and regular rhythm  Heart sounds: Normal heart sounds  No murmur  No friction rub  No gallop  Pulmonary:      Effort: Pulmonary effort is normal  No respiratory distress  Breath sounds: Normal breath sounds  No wheezing  Chest:      Breasts: Breasts are symmetrical          Right: Normal  No swelling, bleeding, inverted nipple, mass, nipple discharge, skin change or tenderness  Left: Normal  No swelling, bleeding, inverted nipple, mass, nipple discharge, skin change or tenderness  Abdominal:      General: There is no distension  Palpations: Abdomen is soft  There is no mass  Tenderness: There is no abdominal tenderness  There is no guarding or rebound  Hernia: No hernia is present  Lymphadenopathy:      Cervical: No cervical adenopathy  Upper Body:      Right upper body: No supraclavicular, axillary or pectoral adenopathy  Left upper body: No supraclavicular, axillary or pectoral adenopathy  Lower Body: No right inguinal adenopathy  No left inguinal adenopathy  Neurological:      Mental Status: She is alert and oriented to person, place, and time  Skin:     General: Skin is warm and dry     Psychiatric:         Behavior: Behavior normal

## 2021-06-01 PROBLEM — Z01.419 ROUTINE GYNECOLOGICAL EXAMINATION: Status: ACTIVE | Noted: 2021-06-01

## 2021-06-01 NOTE — ASSESSMENT & PLAN NOTE
Pap guidelines reviewed  Pap with HPV done today  Will plan to continue Margaux 24 Fe  Script renewed to pharmacy  Script for routine blood work given including TFTs, CBC, CMP, Lipid panel, and Hemoglobin A1C  Encouraged patient to also follow up with PCP for diabetes management  Reviewed mammogram guidelines  Script for mammogram given  Return to office for annual or as needed

## 2021-07-29 ENCOUNTER — TELEPHONE (OUTPATIENT)
Dept: OBGYN CLINIC | Facility: CLINIC | Age: 41
End: 2021-07-29

## 2021-07-29 NOTE — TELEPHONE ENCOUNTER
Pt has a new [address] [facility] in [address]  She said they do not carry her birth control and she wants to know if there is a different prescription she can take

## 2021-07-29 NOTE — TELEPHONE ENCOUNTER
Called pharmacist  Has medication in different generic form, just not the brand  Can get brand if she wants, just takes a few days to get in  Otherwise will fill generic  Patient is aware and open to generic   Aware pharmacist will fill for her and to follow up with them

## 2022-05-24 ENCOUNTER — ANNUAL EXAM (OUTPATIENT)
Dept: OBGYN CLINIC | Facility: CLINIC | Age: 42
End: 2022-05-24
Payer: COMMERCIAL

## 2022-05-24 VITALS
HEIGHT: 69 IN | DIASTOLIC BLOOD PRESSURE: 84 MMHG | WEIGHT: 203.6 LBS | SYSTOLIC BLOOD PRESSURE: 128 MMHG | BODY MASS INDEX: 30.16 KG/M2

## 2022-05-24 DIAGNOSIS — N92.0 MENORRHAGIA WITH REGULAR CYCLE: ICD-10-CM

## 2022-05-24 DIAGNOSIS — Z71.85 HPV VACCINE COUNSELING: ICD-10-CM

## 2022-05-24 DIAGNOSIS — Z12.31 ENCOUNTER FOR SCREENING MAMMOGRAM FOR MALIGNANT NEOPLASM OF BREAST: ICD-10-CM

## 2022-05-24 DIAGNOSIS — Z30.41 SURVEILLANCE FOR BIRTH CONTROL, ORAL CONTRACEPTIVES: ICD-10-CM

## 2022-05-24 DIAGNOSIS — Z01.419 ENCOUNTER FOR GYNECOLOGICAL EXAMINATION (GENERAL) (ROUTINE) WITHOUT ABNORMAL FINDINGS: Primary | ICD-10-CM

## 2022-05-24 PROCEDURE — 99396 PREV VISIT EST AGE 40-64: CPT | Performed by: PHYSICIAN ASSISTANT

## 2022-05-24 RX ORDER — ORAL SEMAGLUTIDE 7 MG/1
TABLET ORAL
COMMUNITY
Start: 2022-05-02

## 2022-05-24 RX ORDER — NORETHINDRONE ACETATE AND ETHINYL ESTRADIOL AND FERROUS FUMARATE 1MG-20(21)
KIT ORAL
COMMUNITY
Start: 2022-05-04 | End: 2022-05-24 | Stop reason: ALTCHOICE

## 2022-05-24 RX ORDER — NORETHINDRONE ACETATE/ETHINYL ESTRADIOL AND FERROUS FUMARATE 1MG-20(24)
1 KIT ORAL DAILY
Qty: 84 TABLET | Refills: 3 | Status: SHIPPED | OUTPATIENT
Start: 2022-05-24

## 2022-05-24 NOTE — ASSESSMENT & PLAN NOTE
The current ASCCP guidelines were reviewed  Patient's last pap was 5/19/21 WNL (-) ECC (+) HRHPV type 16/18 neg and therefore, a pap with HPV cotesting is indicated at this time  I emphasized the importance of an annual pelvic and breast exam  Patient ok to have a pap done today

## 2022-05-24 NOTE — PROGRESS NOTES
Assessment/Plan   Diagnoses and all orders for this visit:    Encounter for gynecological examination (general) (routine) without abnormal findings  -     IGP, Aptima HPV, Rfx 16/18,45  The current ASCCP guidelines were reviewed  Patient's last pap was 5/19/21 WNL (-) ECC (+) HRHPV type 16/18 neg and therefore, a pap with HPV cotesting is indicated at this time  I emphasized the importance of an annual pelvic and breast exam  Patient ok to have a pap done today  Encounter for screening mammogram for malignant neoplasm of breast  -     Mammo screening bilateral w 3d & cad; Future  A yearly mammogram is recommended for breast cancer screening starting at age 36;    Menorrhagia with regular cycle  -     norethindrone-ethinyl estradiol-ferrous fumarate (Margaux 24 FE) 1-20 MG-MCG(24) per tablet; Take 1 tablet by mouth in the morning  Surveillance for birth control, oral contraceptives  -     norethindrone-ethinyl estradiol-ferrous fumarate (Margaux 24 FE) 1-20 MG-MCG(24) per tablet; Take 1 tablet by mouth in the morning  Contraception - continue Margaux 24 Fe 1 tab po daily    HPV vaccine counseling  The patient has not had the Gardasil vaccine series, which is recommended for patients from 545 years of age  Reviewed and declines at this time  Discussion  Per patient PCP aware of OCP use with history of DM/BP/high cholesterol and ok to continue  BP well managed today  I have discussed the importance of monthly self-breast exams, exercise and healthy diet as well as adequate intake of calcium and vitamin D  Encourage MVI q day and r/gera importance of folic acid; Encourage 30-40 min weight bearing exercise most days of week  Encourage safe sexual practices; STI testing - declines  All questions have been answered to her satisfaction  RTO for APE or sooner if needed    Subjective     HPI   Maged Wakefield is a 39 y o  female who presents for annual well woman exam    Menarche - 15; LMP - 5/4/22;  Periods are reg q month and last 3-6 days; Some months barely any bleeding, however, the past 2 months period has been more heavy and crampy - has been on Loestrin 24 Fe from a good amount of time now and has worked well  Most recent pharmacy pick-up - given Microgestin Fe 1/20 because did not have Loestrin 24 Fe in stock for her so has switched this month but desires to switch back  No excessive bleeding; No intermenstrual bleeding or spotting; Cramps are tolerable  No vulvar itch/burn; No vaginal itch/burn; No abn discharge or odor; No urinary sx - burning/pain/frequency/hematuria  (+) SBEs - no breast masses, asymmetry, nipple discharge or bleeding, changes in skin of breast, or breast tenderness bilaterally  No abd/pelvic pain or HAs;   (+) menopausal symptoms: (+) hot flashes/night sweats - comes and goes; no problems with intercourse, vaginal dryness; sleeping well;   Pt is sexually active in a mutually monog/ sexual relationship; No issues with intercourse; She declines sti/hiv/hep testing; Feels safe at home  Current contraception: Margaux 24 Fe  Gardasil - not done  (+) PCP for routine Bw/care; Last Pap - 5/19/21 WNL (-) ECC (+) HRHPV type 16/18 neg  History of abnormal Pap smear: yes  Last mammo - none  History of abnormal mammogram:   Last colonoscopy - none    Review of Systems   Constitutional: Negative for activity change, fatigue, fever and unexpected weight change  HENT: Negative for congestion, dental problem, sinus pressure and sinus pain  Eyes: Negative for visual disturbance  Respiratory: Negative for cough, shortness of breath and wheezing  Cardiovascular: Negative for chest pain and leg swelling  Gastrointestinal: Negative for abdominal distention, abdominal pain, blood in stool, constipation, diarrhea, nausea and vomiting  Endocrine: Negative for polydipsia     Genitourinary: Negative for difficulty urinating, dyspareunia, dysuria, frequency, hematuria, menstrual problem, pelvic pain, urgency, vaginal bleeding, vaginal discharge and vaginal pain  Musculoskeletal: Negative for arthralgias and back pain  Allergic/Immunologic: Negative for environmental allergies  Neurological: Negative for dizziness, seizures and headaches  Psychiatric/Behavioral: Negative for dysphoric mood and sleep disturbance  The patient is not nervous/anxious          The following portions of the patient's history were reviewed and updated as appropriate: allergies, current medications, past family history, past medical history, past social history, past surgical history and problem list          OB History        2    Para   2    Term   2            AB        Living   3       SAB        IAB        Ectopic        Multiple   1    Live Births   3           Obstetric Comments   :  LTCS secondary nuchal cord; A2GDM;  repeat [id number]-[id number]-E/M, A2GDM  2008 - CF negative              Past Medical History:   Diagnosis Date    Diabetes type 2, controlled ([address] )     Hyperlipidemia     Hypertension     Oligomenorrhea     PCOS (polycystic ovarian syndrome)        Past Surgical History:   Procedure Laterality Date     SECTION, LOW TRANSVERSE      WISDOM TOOTH EXTRACTION         Family History   Problem Relation Age of Onset    Stroke Mother     Hypertension Mother     Diabetes Paternal Grandmother     Lung cancer Paternal Grandmother        Social History     Socioeconomic History    Marital status: /Civil Union     Spouse name: Not on file    Number of children: Not on file    Years of education: Not on file    Highest education level: Not on file   Occupational History    Not on file   Tobacco Use    Smoking status: Former Smoker    Smokeless tobacco: Never Used   Vaping Use    Vaping Use: Never used   Substance and Sexual Activity    Alcohol use: Yes     Comment: 2-3 a week    Drug use: Never    Sexual activity: Yes     Partners: Male     Birth control/protection: OCP Other Topics Concern    Not on file   Social History Narrative    Monogamous relationship      Social Determinants of Health     Financial Resource Strain: Not on file   Food Insecurity: Not on file   Transportation Needs: Not on file   Physical Activity: Not on file   Stress: Not on file   Social Connections: Not on file   Intimate Partner Violence: Not on file   Housing Stability: Not on file         Current Outpatient Medications:     acarbose (PRECOSE) 25 mg tablet, 3 x daily, Disp: , Rfl: 1    atorvastatin (LIPITOR) 20 mg tablet, Take 20 mg by mouth every evening, Disp: , Rfl: 0    lisinopril (ZESTRIL) 5 mg tablet, , Disp: , Rfl: 1    norethindrone-ethinyl estradiol-ferrous fumarate (Margaux 24 FE) 1-20 MG-MCG(24) per tablet, Take 1 tablet by mouth in the morning , Disp: 84 tablet, Rfl: 3    other medication, see sig,, Tess, Disp: , Rfl:     Rybelsus 7 MG TABS, , Disp: , Rfl:     atorvastatin (LIPITOR) 20 mg tablet, Take 20 mg by mouth daily, Disp: , Rfl:     Allergies   Allergen Reactions    Naproxen        Objective   Vitals:    05/24/22 0928   BP: 128/84   Weight: 92 4 kg (203 lb 9 6 oz)   Height: 5' 9" (1 753 m)     Physical Exam  Vitals reviewed  Constitutional:       General: She is awake  She is not in acute distress  Appearance: Normal appearance  She is well-developed and well-groomed  She is not ill-appearing, toxic-appearing or diaphoretic  HENT:      Head: Normocephalic and atraumatic  Eyes:      Conjunctiva/sclera: Conjunctivae normal    Neck:      Thyroid: No thyroid mass, thyromegaly or thyroid tenderness  Cardiovascular:      Rate and Rhythm: Normal rate and regular rhythm  Heart sounds: Normal heart sounds  No murmur heard  Pulmonary:      Effort: Pulmonary effort is normal  No tachypnea, bradypnea or respiratory distress  Breath sounds: Normal breath sounds  No stridor or decreased air movement  No wheezing     Chest:   Breasts: Breasts are symmetrical  Right: Normal  No swelling, bleeding, inverted nipple, mass, nipple discharge, skin change, tenderness, axillary adenopathy or supraclavicular adenopathy  Left: Normal  No swelling, bleeding, inverted nipple, mass, nipple discharge, skin change, tenderness, axillary adenopathy or supraclavicular adenopathy  Abdominal:      General: There is no distension  Palpations: Abdomen is soft  There is no hepatomegaly, splenomegaly or mass  Tenderness: There is no abdominal tenderness  Hernia: No hernia is present  There is no hernia in the left inguinal area or right inguinal area  Genitourinary:     General: Normal vulva  Exam position: Supine  Pubic Area: No rash or pubic lice  Labia:         Right: No rash, tenderness, lesion or injury  Left: No rash, tenderness, lesion or injury  Urethra: No prolapse, urethral pain, urethral swelling or urethral lesion  Vagina: Normal  No signs of injury and foreign body  No vaginal discharge, erythema, tenderness, bleeding, lesions or prolapsed vaginal walls  Cervix: No cervical motion tenderness, discharge, friability, lesion, erythema or cervical bleeding  Uterus: Not deviated, not enlarged, not fixed, not tender and no uterine prolapse  Adnexa:         Right: No mass, tenderness or fullness  Left: No mass, tenderness or fullness  Rectum: Normal  Guaiac result negative  No mass, tenderness, anal fissure, external hemorrhoid or internal hemorrhoid  Normal anal tone  Lymphadenopathy:      Cervical: No cervical adenopathy  Upper Body:      Right upper body: No supraclavicular or axillary adenopathy  Left upper body: No supraclavicular or axillary adenopathy  Lower Body: No right inguinal adenopathy  No left inguinal adenopathy  Skin:     General: Skin is warm and dry  Neurological:      Mental Status: She is alert and oriented to person, place, and time     Psychiatric: Mood and Affect: Mood and affect normal          Speech: Speech normal          Behavior: Behavior normal  Behavior is cooperative  Thought Content:  Thought content normal          Judgment: Judgment normal

## 2022-07-26 ENCOUNTER — HOSPITAL ENCOUNTER (OUTPATIENT)
Dept: RADIOLOGY | Facility: HOSPITAL | Age: 42
Discharge: HOME | End: 2022-07-26
Payer: COMMERCIAL

## 2022-07-26 VITALS — BODY MASS INDEX: 30.07 KG/M2 | HEIGHT: 69 IN | WEIGHT: 203 LBS

## 2022-07-26 DIAGNOSIS — Z12.31 ENCOUNTER FOR SCREENING MAMMOGRAM FOR MALIGNANT NEOPLASM OF BREAST: ICD-10-CM

## 2022-07-26 PROCEDURE — 77063 BREAST TOMOSYNTHESIS BI: CPT

## 2022-07-26 PROCEDURE — 77067 SCR MAMMO BI INCL CAD: CPT

## 2023-09-20 DIAGNOSIS — N92.0 MENORRHAGIA WITH REGULAR CYCLE: ICD-10-CM

## 2023-09-20 DIAGNOSIS — Z30.41 SURVEILLANCE FOR BIRTH CONTROL, ORAL CONTRACEPTIVES: ICD-10-CM

## 2023-09-20 RX ORDER — NORETHINDRONE ACETATE AND ETHINYL ESTRADIOL, AND FERROUS FUMARATE 1MG-20(24)
KIT ORAL
Qty: 84 TABLET | Refills: 0 | Status: SHIPPED | OUTPATIENT
Start: 2023-09-20

## 2023-09-29 NOTE — PROGRESS NOTES
Assessment/Plan   Problem List Items Addressed This Visit    None  Visit Diagnoses     Well woman exam    -  Primary    Relevant Orders    IGP, Aptima HPV, Rfx 16/18,45    Pelvic pain        Relevant Orders    US pelvis complete w transvaginal    Encounter for screening mammogram for malignant neoplasm of breast        Relevant Orders    Mammo screening bilateral w 3d & cad          Discussion  I have discussed the importance of monthly self-breast exams, exercise and healthy diet as well as adequate intake of calcium and vitamin D. Encourage safe sexual practices; STI testing - declines  Contraception - WADE; Reviewed history of hypertension and risk of VTE with estrogen. Her BP have been stable at recent visits and she verbalizes understanding of risk of VTE with WADE. Educated patient regarding progesterone-only options including POP, DMPA, Nexplanon, and IUDs. Discussed risks, benefits, side effects, and procedures of each method. Addressed all questions. She will consider and call office for change. The current ASCCP guidelines were reviewed. Patient's last pap was 2022 but abnormal, and therefore, a pap with HPV cotesting is indicated at this time. Mammogram ordered today for routine breast cancer screening. Reviewed pelvic pain sounds musculoskeletal in origin. Encouraged core exercise and referral to pelvic PT. She declines PT referral. Patient is concerned with history of PCOS and ovarian cysts. She desires TVUS. Order placed. All questions have been answered to her satisfaction  RTO for APE or sooner if needed      Subjective     HPI   Therese Elliott is a 43 y.o. female who presents for annual well woman exam.     LMP - 09/23/23; Periods regular every 28-30 days but irregularity in   in consistency and flow. She reports flow changes from month to month between heavy to light flow. No vulvar itch/burn; No vaginal itch/burn; No abn discharge or odor;      No urinary sx - burning/pain/frequency/hematuria    (+) SBEs - no breast masses, asymmetry, nipple discharge or bleeding, changes in skin of breast, or breast tenderness bilaterally    +pelvic pain; bilateral; occurs with movement. Menopausal sxs: +hot flashes/night sweats; tolerable. Pt is sexually active in a mutually monog/ sexual relationship; No issues with intercourse; She declines sti/hiv/hep testing; Feels safe at home    Current contraception: WADE    (+) PCP for routine Bw/care; Last Pap -  WNL    Last mammo -  BIRADS1      Review of Systems   Constitutional: Negative for fatigue. Eyes: Negative for photophobia and visual disturbance. Respiratory: Negative for cough and shortness of breath. Cardiovascular: Negative for chest pain and palpitations. Gastrointestinal: Negative for abdominal pain, blood in stool, constipation, diarrhea, nausea and rectal pain. Genitourinary: Positive for pelvic pain. Negative for dyspareunia, dysuria, flank pain, frequency, genital sores, menstrual problem, urgency, vaginal bleeding, vaginal discharge and vaginal pain. Musculoskeletal: Negative for arthralgias and back pain. Skin: Negative for rash. Neurological: Negative for weakness and headaches.        The following portions of the patient's history were reviewed and updated as appropriate: allergies, current medications, past family history, past medical history, past social history, past surgical history and problem list.         OB History        3    Para   3    Term   3            AB        Living   3       SAB        IAB        Ectopic        Multiple   1    Live Births   3           Obstetric Comments   :  LTCS secondary nuchal cord; A2GDM;  repeat [id number]/M, A2GDM  2008 - CF negative              Past Medical History:   Diagnosis Date   • Diabetes type 2, controlled ([address])    • Hyperlipidemia    • Hypertension    • Oligomenorrhea    • PCOS (polycystic ovarian syndrome)        Past Surgical History:   Procedure Laterality Date   •  SECTION, LOW TRANSVERSE     • WISDOM TOOTH EXTRACTION         Family History   Problem Relation Age of Onset   • Stroke Mother    • Hypertension Mother    • Diabetes Paternal Grandmother    • Lung cancer Paternal Grandmother        Social History     Socioeconomic History   • Marital status: /Civil Union     Spouse name: Not on file   • Number of children: Not on file   • Years of education: Not on file   • Highest education level: Not on file   Occupational History   • Not on file   Tobacco Use   • Smoking status: Former     Packs/day: 0.25     Years: 5.00     Total pack years: 1.25     Types: Cigarettes   • Smokeless tobacco: Never   Vaping Use   • Vaping Use: Never used   Substance and Sexual Activity   • Alcohol use: Yes     Comment: 2-3 a week   • Drug use: Never   • Sexual activity: Yes     Partners: Male     Birth control/protection: OCP   Other Topics Concern   • Not on file   Social History Narrative    Monogamous relationship      Social Determinants of Health     Financial Resource Strain: Not on file   Food Insecurity: Not on file   Transportation Needs: Not on file   Physical Activity: Not on file   Stress: Not on file   Social Connections: Not on file   Intimate Partner Violence: Not on file   Housing Stability: Not on file         Current Outpatient Medications:   •  acarbose (PRECOSE) 25 mg tablet, 3 x daily, Disp: , Rfl: 1  •  atorvastatin (LIPITOR) 20 mg tablet, Take 20 mg by mouth every evening, Disp: , Rfl: 0  •  Lucie 24 Fe 1-20 MG-MCG(24) per tablet, TAKE 1 TABLET BY MOUTH IN THE MORNING., Disp: 84 tablet, Rfl: 0  •  lisinopril (ZESTRIL) 5 mg tablet, , Disp: , Rfl: 1  •  Mounjaro 12.5 MG/0.5ML, , Disp: , Rfl:   •  other medication, see sig,, Truvy, Disp: , Rfl:   •  Rybelsus 7 MG TABS, , Disp: , Rfl:     Allergies   Allergen Reactions   • Naproxen        Objective   Vitals:    10/03/23 1251   BP: 140/62 BP Location: Left arm   Patient Position: Sitting   Cuff Size: Standard   Weight: 90.4 kg (199 lb 3.2 oz)   Height: 5' 9" (1.753 m)     Physical Exam  Vitals and nursing note reviewed. Constitutional:       Appearance: Normal appearance. She is well-developed and normal weight. HENT:      Head: Normocephalic and atraumatic. Eyes:      Conjunctiva/sclera: Conjunctivae normal.   Cardiovascular:      Rate and Rhythm: Normal rate and regular rhythm. Heart sounds: Normal heart sounds. Pulmonary:      Effort: Pulmonary effort is normal.      Breath sounds: Normal breath sounds. Chest:   Breasts:     Breasts are symmetrical.      Right: Normal. No inverted nipple, mass, nipple discharge, skin change or tenderness. Left: Normal. No inverted nipple, mass, nipple discharge, skin change or tenderness. Abdominal:      General: Abdomen is flat. There is no distension. Palpations: Abdomen is soft. There is no mass. Tenderness: There is no abdominal tenderness. There is no right CVA tenderness or left CVA tenderness. Genitourinary:     General: Normal vulva. Exam position: Lithotomy position. Pubic Area: No rash or pubic lice. Labia:         Right: No rash or tenderness. Left: No rash or tenderness. Urethra: No urethral pain. Vagina: Normal. No vaginal discharge. Cervix: Normal.      Uterus: Normal. No uterine prolapse. Adnexa: Right adnexa normal and left adnexa normal.        Right: No mass or tenderness. Left: No mass or tenderness. Musculoskeletal:         General: No tenderness. Normal range of motion. Cervical back: Normal range of motion. No tenderness. Right lower leg: No edema. Left lower leg: No edema. Lymphadenopathy:      Cervical: No cervical adenopathy. Upper Body:      Right upper body: No supraclavicular or axillary adenopathy. Left upper body: No supraclavicular or axillary adenopathy. Lower Body: No right inguinal adenopathy. No left inguinal adenopathy. Skin:     General: Skin is warm and dry. Neurological:      Mental Status: She is alert and oriented to person, place, and time. Motor: No weakness. Psychiatric:         Mood and Affect: Mood normal.         Behavior: Behavior normal.         Thought Content: Thought content normal.         Judgment: Judgment normal.         Patient Instructions   [facility]. [facility]

## 2023-10-03 ENCOUNTER — ANNUAL EXAM (OUTPATIENT)
Dept: OBGYN CLINIC | Facility: CLINIC | Age: 43
End: 2023-10-03
Payer: COMMERCIAL

## 2023-10-03 VITALS
DIASTOLIC BLOOD PRESSURE: 82 MMHG | SYSTOLIC BLOOD PRESSURE: 120 MMHG | WEIGHT: 199.2 LBS | BODY MASS INDEX: 29.51 KG/M2 | HEIGHT: 69 IN

## 2023-10-03 DIAGNOSIS — R10.2 PELVIC PAIN: ICD-10-CM

## 2023-10-03 DIAGNOSIS — Z12.31 ENCOUNTER FOR SCREENING MAMMOGRAM FOR MALIGNANT NEOPLASM OF BREAST: ICD-10-CM

## 2023-10-03 DIAGNOSIS — Z01.419 WELL WOMAN EXAM: Primary | ICD-10-CM

## 2023-10-03 PROCEDURE — 99396 PREV VISIT EST AGE 40-64: CPT

## 2023-10-03 RX ORDER — TIRZEPATIDE 12.5 MG/.5ML
INJECTION, SOLUTION SUBCUTANEOUS
COMMUNITY
Start: 2023-10-03

## 2023-10-09 ENCOUNTER — HOSPITAL ENCOUNTER (OUTPATIENT)
Dept: RADIOLOGY | Facility: HOSPITAL | Age: 43
Discharge: HOME | End: 2023-10-09
Payer: COMMERCIAL

## 2023-10-09 DIAGNOSIS — R10.2 PELVIC PAIN: ICD-10-CM

## 2023-10-09 PROCEDURE — 76856 US EXAM PELVIC COMPLETE: CPT

## 2023-10-09 PROCEDURE — 76830 TRANSVAGINAL US NON-OB: CPT

## 2023-10-11 LAB
CYTOLOGIST CVX/VAG CYTO: ABNORMAL
DX ICD CODE: ABNORMAL
DX ICD CODE: ABNORMAL
HPV GENOTYPE REFLEX: ABNORMAL
HPV I/H RISK 4 DNA CVX QL PROBE+SIG AMP: NEGATIVE
OTHER STN SPEC: ABNORMAL
PATH REPORT.FINAL DX SPEC: ABNORMAL
PATHOLOGIST CVX/VAG CYTO: ABNORMAL
RECOM F/U CVX/VAG CYTO: ABNORMAL
SL AMB NOTE:: ABNORMAL
SL AMB SPECIMEN ADEQUACY: ABNORMAL
SL AMB TEST METHODOLOGY: ABNORMAL

## 2023-12-04 ENCOUNTER — TELEPHONE (OUTPATIENT)
Dept: OBGYN CLINIC | Facility: CLINIC | Age: 43
End: 2023-12-04

## 2023-12-04 DIAGNOSIS — Z30.011 ENCOUNTER FOR INITIAL PRESCRIPTION OF CONTRACEPTIVE PILLS: Primary | ICD-10-CM

## 2023-12-04 RX ORDER — ACETAMINOPHEN AND CODEINE PHOSPHATE 120; 12 MG/5ML; MG/5ML
1 SOLUTION ORAL DAILY
Qty: 84 TABLET | Refills: 0 | Status: SHIPPED | OUTPATIENT
Start: 2023-12-04

## 2023-12-04 NOTE — TELEPHONE ENCOUNTER
Pt called because her birth control was denied was wondering why? Told her it looks like it was denied because a history of hypertension. Patient said she has been on it for a long time even with hypertension. Seen joseph recently for her annual and joseph reviewed options of POP, patient was not aware she had to diffidently switch.

## 2023-12-04 NOTE — TELEPHONE ENCOUNTER
Spoke to patient, reviewed why patient needs to switch OCP medication. Patient understood. Patient will need new birth control sent to pharmacy as she is almost out of her current pack.

## 2024-01-02 ENCOUNTER — TELEPHONE (OUTPATIENT)
Dept: OBGYN CLINIC | Facility: CLINIC | Age: 44
End: 2024-01-02

## 2024-01-02 NOTE — TELEPHONE ENCOUNTER
Patient scheduled for feb 28th to discuss ulrasound and also discuss other BC options if needed. Offered patient sooner but unable to accommodate due to work. Patient said the bleeding is not heavy it is just like a brown spotting. Aware to call us if bleeding becomes heavy or if she becomes dizzy, lightheaded or SOB.

## 2024-01-02 NOTE — TELEPHONE ENCOUNTER
Patient left message on machine - recently had birth control switched, ever since, starting this past Friday 12/29/23 has been bleeding. Not sure if normal. Wondering if could make appt to come in and speak to provider for test results from when had internal ultrasound completed.

## 2024-02-21 PROBLEM — Z01.419 ENCOUNTER FOR GYNECOLOGICAL EXAMINATION (GENERAL) (ROUTINE) WITHOUT ABNORMAL FINDINGS: Status: RESOLVED | Noted: 2022-05-24 | Resolved: 2024-02-21

## 2024-02-26 ENCOUNTER — HOSPITAL ENCOUNTER (OUTPATIENT)
Dept: RADIOLOGY | Facility: HOSPITAL | Age: 44
Discharge: HOME | End: 2024-02-26
Payer: COMMERCIAL

## 2024-02-26 VITALS — WEIGHT: 200 LBS | HEIGHT: 69 IN | BODY MASS INDEX: 29.62 KG/M2

## 2024-02-26 DIAGNOSIS — Z12.31 ENCOUNTER FOR SCREENING MAMMOGRAM FOR MALIGNANT NEOPLASM OF BREAST: ICD-10-CM

## 2024-02-26 PROCEDURE — 77063 BREAST TOMOSYNTHESIS BI: CPT

## 2024-02-26 PROCEDURE — 77067 SCR MAMMO BI INCL CAD: CPT

## 2024-02-28 ENCOUNTER — OFFICE VISIT (OUTPATIENT)
Dept: OBGYN CLINIC | Facility: CLINIC | Age: 44
End: 2024-02-28
Payer: COMMERCIAL

## 2024-02-28 VITALS
SYSTOLIC BLOOD PRESSURE: 132 MMHG | WEIGHT: 204 LBS | BODY MASS INDEX: 30.21 KG/M2 | DIASTOLIC BLOOD PRESSURE: 86 MMHG | HEIGHT: 69 IN

## 2024-02-28 DIAGNOSIS — N92.6 IRREGULAR MENSES: Primary | ICD-10-CM

## 2024-02-28 PROCEDURE — 99213 OFFICE O/P EST LOW 20 MIN: CPT | Performed by: OBSTETRICS & GYNECOLOGY

## 2024-02-28 RX ORDER — NORETHINDRONE ACETATE AND ETHINYL ESTRADIOL, AND FERROUS FUMARATE 1MG-20(24)
1 KIT ORAL DAILY
Qty: 84 TABLET | Refills: 3 | Status: SHIPPED | OUTPATIENT
Start: 2024-02-28

## 2024-02-28 NOTE — PROGRESS NOTES
Subjective     Argentina Winn is a 43 y.o. G2, P3 female here for a problem visit.  At her last annual patient was switched to progestin only OCP due to her diagnosis of hypertension.  Patient has been maintained for her adenomyosis and polycystic ovary on Constance FE .  She switched to progestin only pill in December and had some prolonged brown stringy flows with some increase cramping.  Patient also noted occasional sharp pain when sneezing we discussed this could be scar tissue versus the adenomyosis however she had this pain even before she had children.  We discussed that her blood pressure is generally well-controlled if there are concerns from her primary care physician or if her blood pressure becomes more difficult to control then we would consider switching to other options which we also reviewed again today such as progesterone IUD or Nexplanon.  Patient was also interested in option of hysterectomy which I discussed with her we discussed ovarian retention we discussed adenomyosis which has been present for her previously and we discussed evidence of polycystic ovary on her ultrasound which she is also known of previously.  Patient reports no further questions and desires to restart combination low-dose OCP at this time she will continue to monitor blood pressure along with her primary care physician and will let us know if there are any concerns.     Gynecologic History  Patient's last menstrual period was 2024 (exact date).  Contraception:  Shabnam OCP  Last Pap: 2023. Results were: ASCUS negative high risk HPV  Last mammogram: 2024. Results were: normal    Obstetric History  OB History    Para Term  AB Living   2 2 2     3   SAB IAB Ectopic Multiple Live Births         1 3      # Outcome Date GA Lbr Terell/2nd Weight Sex Delivery Anes PTL Lv   2A Term  38w0d    CS-Unspec   RAMONA   2B Term  38w0d    CS-Unspec   RAMONA   1 Term  40w0d    CS-Unspec   RAMONA  "     Obstetric Comments   : 2009 LTCS secondary nuchal cord; A2GDM; 2014 repeat [id number]/M, A2GDM   2008 - CF negative          The following portions of the patient's history were reviewed and updated as appropriate: allergies, current medications, past family history, past medical history, past social history, past surgical history, and problem list.    Review of Systems  Review of Systems   Constitutional: Negative.  Negative for chills and fever.   HENT: Negative.  Negative for ear pain and sore throat.    Eyes: Negative.  Negative for pain and visual disturbance.   Respiratory: Negative.  Negative for cough and shortness of breath.    Cardiovascular: Negative.  Negative for chest pain and palpitations.   Gastrointestinal:  Positive for diarrhea. Negative for abdominal pain and vomiting.   Genitourinary: Negative.  Negative for dysuria and hematuria.   Musculoskeletal: Negative.  Negative for arthralgias and back pain.   Skin: Negative.  Negative for color change and rash.   Neurological: Negative.  Negative for seizures and syncope.   All other systems reviewed and are negative.       Objective     /86 (BP Location: Left arm, Patient Position: Sitting, Cuff Size: Standard)   Ht 5' 9\" (1.753 m)   Wt 92.5 kg (204 lb)   LMP 2024 (Exact Date)   BMI 30.13 kg/m²   General appearance: alert and oriented, in no acute distress  Head: Normocephalic, without obvious abnormality, atraumatic  Extremities: extremities normal, warm and well-perfused; no cyanosis, clubbing, or edema      Assessment  43-year-old G2, P3 with poorly controlled menses on progestin only pill she has well-controlled hypertension we discussed returning to low-dose OCP which she had been on previously without exacerbation of her hypertension.  We also again reviewed adenomyosis and polycystic ovary which she has known that she has had.  We also discussed options of progestin IUD or Nexplanon to control her symptoms we also " reviewed at her request option of hysterectomy with ovarian preservation.     Plan  Low-dose OCP again prescribed for patient call with any concerns continue to monitor blood pressure return in October for annual or sooner as needed

## 2025-01-31 DIAGNOSIS — N92.6 IRREGULAR MENSES: ICD-10-CM

## 2025-01-31 RX ORDER — NORETHINDRONE ACETATE AND ETHINYL ESTRADIOL, AND FERROUS FUMARATE 1MG-20(24)
1 KIT ORAL DAILY
Qty: 84 TABLET | Refills: 0 | Status: SHIPPED | OUTPATIENT
Start: 2025-01-31

## 2025-01-31 NOTE — TELEPHONE ENCOUNTER
Yearly exam 2/6/25    Medication: norethindrone-ethinyl estradiol-ferrous fumarate (Lucie 24 Fe) 1-20 MG-MCG(24) per tablet     Dose/Frequency: take 1 by mouth daily    Quantity: 84    Pharmacy: [facility] [address] [place] [address]      Office:   [] PCP/Provider -   [] Speciality/Provider -     Does the patient have enough for 3 days?   [x] Yes   [] No - Send as HP to POD

## 2025-02-17 NOTE — PROGRESS NOTES
Assessment & Plan   Diagnoses and all orders for this visit:    Women's annual routine gynecological examination    Cervical cancer screening    Encounter for screening mammogram for malignant neoplasm of breast  -     Mammo screening bilateral w 3d and cad; Future    Surveillance for birth control, oral contraceptives    Other orders  -     Mounjaro 15 MG/0.5ML SOAJ        ASSESSMENT & PLAN: Argentina is a 44 y.o.  with normal gynecologic exam.    1.  Routine well woman exam done today.  2.  Cervical Cancer Screening: Pap with cotesting HPV was done today.  Current ASCCP Guidelines reviewed.   3.. Mammogram is up to date. Mammogram ordered today. Recommend yearly mammograms per ACOG guidelines.  4.  Argentina declined STD testing in a mutually monogamous relationship  5. Colon cancer screening to begin at age 45 with no other risk factors.   6.  She accepted contraception OCP. Take as directed (ACHES reviewed). Benefits, risks and alternatives discussed/reviewed. Refill proivded by Dr. Calloway 2025. Reviewed VTE risk. No change in medical history, safe to remain on OCP  7. I have discussed the importance of monthly self-breast exams, exercise a minimum of 150 minutes a week including weight bearing exercises and maintaining a healthy diet including adequate supplementation of Calcium and Vitamin D.   8 Return to office in 12 months for annual exam.   9. Call your insurance company to verify coverage prior to completing any ordered tests.     All questions answered to the best of my ability.      Subjective     HPI   Argentina Winn is a 44 y.o. female who presents for annual well woman exam.     GYN:  Menses are regular, monthly, on placebo week lasting 2-3 days with light flow. denies intermenstrual bleeding, or spotting. Reports last period was two days of light brown spotting.   denies vaginal discharge, labial erythema or lesions.   denies vaginal itching, irritation and dryness.  Contraception: OCP Lucie  FE -20  Patient is  sexually active with male partner or 25 years. denies issus with intercourse. She declines STI testing.   3 gynecologic surgeries - C/S x3.   History of adenomyosis and PCOS    OB:  OB History    Para Term  AB Living   2 2 2   3   SAB IAB Ectopic Multiple Live Births      1 3      # Outcome Date GA Lbr Terell/2nd Weight Sex Type Anes PTL Lv   2A Term  38w0d    CS-Unspec   RAMONA   2B Term  38w0d    CS-Unspec   RAMONA   1 Term  40w0d    CS-Unspec   RAMONA      Obstetric Comments   :  LTCS secondary nuchal cord; A2GDM;  repeat [id number]-Z/M, A2GDM   2008 - CF negative          :  denies dysuria, urinary frequency or urgency.  denies hematuria, flank pain, incontinence.    Breast:  denies breast mass, skin changes, dimpling, reddening, nipple retraction.  denies breast discharge.  denies breast tenderness bilaterally    Patient does not have a family history of breast, endometrial, colon, or ovarian ca.     Health Maintenance:    Active lifestye   She feels safe at home and denies domestic violence    She does follow a well balanced diet.    She does not use tobacco and social alcohol  She does follow with a PCP.    Social History:  Social History     Socioeconomic History    Marital status: /Civil Union     Spouse name: Not on file    Number of children: Not on file    Years of education: Not on file    Highest education level: Not on file   Occupational History    Not on file   Tobacco Use    Smoking status: Former     Current packs/day: 0.25     Average packs/day: 0.3 packs/day for 5.2 years (1.3 ttl pk-yrs)     Types: Cigarettes    Smokeless tobacco: Never   Vaping Use    Vaping status: Never Used   Substance and Sexual Activity    Alcohol use: Yes     Comment: 2-3 a week    Drug use: Never    Sexual activity: Yes     Partners: Male     Birth control/protection: OCP   Other Topics Concern    Not on file   Social History Narrative    Monogamous relationship       Social Drivers of Health     Financial Resource Strain: Low Risk  (9/29/2022)    Received from [facility]    Overall Financial Resource Strain (CARDIA)     Difficulty of Paying Living Expenses: Not hard at all   Food Insecurity: No Food Insecurity (9/30/2022)    Received from [facility]    Hunger Vital Sign     Worried About Running Out of Food in the Last Year: Never true     Ran Out of Food in the Last Year: Never true   Transportation Needs: No Transportation Needs (9/30/2022)    Received from [facility]    PRAPARE - Transportation     Lack of Transportation (Medical): No     Lack of Transportation (Non-Medical): No   Physical Activity: Insufficiently Active (9/30/2022)    Received from [facility]    Exercise Vital Sign     Days of Exercise per Week: 2 days     Minutes of Exercise per Session: 60 min   Stress: No Stress Concern Present (9/30/2022)    Received from [facility]    [employer] [place] [employer] - Occupational Stress Questionnaire     Feeling of Stress : Not at all   Social Connections: Moderately Integrated (9/30/2022)    Received from [facility]    Social Connection and Isolation Panel [NHANES]     Frequency of Communication with Friends and Family: More than three times a week     Frequency of Social Gatherings with Friends and Family: More than three times a week     Attends Congregation Services: Never     Active Member of Clubs or Organizations: Yes     Attends Club or Organization Meetings: More than 4 times per year     Marital Status:    Intimate Partner Violence: Not At Risk (9/30/2022)    Received from [facility]    Humiliation, Afraid, Rape, and Kick questionnaire     Fear of Current or Ex-Partner: No     Emotionally Abused: No     Physically Abused: No     Sexually Abused: No   Housing Stability: Unknown (9/30/2022)     [facility]    Housing Stability Vital Sign     Unable to Pay for Housing in the Last Year: No     Number of Places Lived in the Last Year: Not on file     Unstable Housing in the Last Year: No       Social History     Tobacco Use    Smoking status: Former     Current packs/day: 0.25     Average packs/day: 0.3 packs/day for 5.2 years (1.3 ttl pk-yrs)     Types: Cigarettes    Smokeless tobacco: Never   Vaping Use    Vaping status: Never Used   Substance Use Topics    Alcohol use: Yes     Comment: 2-3 a week    Drug use: Never       Screening:  Cervical cancer: last pap smear in 10/03/2023. Results were ASCUS, -HPV.   History of abnormal pap smears:  NILM, +HRHPV  Breast cancer: last mammogram in 2024. Results were BIRADS 2.  Colon cancer: will begin at age 45.   STD screening: declined.    Review of Systems   Constitutional: Negative.  Negative for activity change, appetite change and fever.   Respiratory:  Negative for shortness of breath.    Cardiovascular:  Negative for chest pain.   Gastrointestinal:  Negative for abdominal pain, constipation, diarrhea and vomiting.   Genitourinary:  Negative for dyspareunia, dysuria, frequency, menstrual problem, pelvic pain, vaginal bleeding, vaginal discharge and vaginal pain.   Skin:  Negative for color change.   Psychiatric/Behavioral: Negative.     All other systems reviewed and are negative.      The following portions of the patient's history were reviewed and updated as appropriate: allergies, current medications, past family history, past medical history, past social history, past surgical history, and problem list.         OB History          2    Para   2    Term   2            AB        Living   3         SAB        IAB        Ectopic        Multiple   1    Live Births   3           Obstetric Comments   : 2009 LTCS secondary nuchal cord; A2GDM; 2014 repeat [id number]-R/M, A2GDM  2008 - CF negative                 Past Medical History:   Diagnosis Date    Diabetes type 2, controlled (HCC)     Hyperlipidemia     Hypertension     Oligomenorrhea     PCOS (polycystic ovarian syndrome)        Past Surgical History:   Procedure Laterality Date     SECTION, LOW TRANSVERSE      WISDOM TOOTH EXTRACTION         Family History   Problem Relation Age of Onset    Stroke Mother     Hypertension Mother     Diabetes Paternal Grandmother     Lung cancer Paternal Grandmother          Current Outpatient Medications:     acarbose (PRECOSE) 25 mg tablet, 3 x daily, Disp: , Rfl: 1    atorvastatin (LIPITOR) 20 mg tablet, Take 20 mg by mouth every evening, Disp: , Rfl: 0    lisinopril (ZESTRIL) 5 mg tablet, , Disp: , Rfl: 1    Mounjaro 15 MG/0.5ML SOAJ, , Disp: , Rfl:     norethindrone-ethinyl estradiol-ferrous fumarate (Lucie 24 Fe) 1-20 MG-MCG(24) per tablet, Take 1 tablet by mouth daily, Disp: 84 tablet, Rfl: 0    Mounjaro 12.5 MG/0.5ML, 10 mg (Patient not taking: Reported on 2025), Disp: , Rfl:     Allergies   Allergen Reactions    Naproxen        Objective   Vitals:    25 1315   BP: 120/82   BP Location: Left arm   Patient Position: Sitting   Cuff Size: Standard   Weight: 89.5 kg (197 lb 6.4 oz)     Physical Exam  Vitals and nursing note reviewed.   Constitutional:       General: She is not in acute distress.     Appearance: Normal appearance. She is not ill-appearing.   HENT:      Head: Normocephalic.   Neck:      Thyroid: No thyromegaly or thyroid tenderness.   Cardiovascular:      Rate and Rhythm: Normal rate and regular rhythm.      Heart sounds: Normal heart sounds.   Pulmonary:      Effort: Pulmonary effort is normal. No respiratory distress.      Breath sounds: Normal breath sounds.   Chest:   Breasts:     Right: Normal. No inverted nipple, nipple discharge, skin change or tenderness.      Left: Normal. No inverted nipple, nipple discharge, skin change or tenderness.   Abdominal:      General: Abdomen is  flat.      Palpations: Abdomen is soft.      Tenderness: There is no abdominal tenderness. There is no guarding.   Genitourinary:     General: Normal vulva.      Exam position: Lithotomy position.      Labia:         Right: No rash, tenderness or lesion.         Left: No rash, tenderness or lesion.       Urethra: No prolapse.      Vagina: Normal. No vaginal discharge, erythema, tenderness or lesions.      Cervix: Normal. No cervical motion tenderness, discharge or lesion.      Uterus: Not tender and no uterine prolapse.       Adnexa:         Right: No tenderness.          Left: No tenderness.     Musculoskeletal:      Cervical back: Neck supple.   Lymphadenopathy:      Cervical: No cervical adenopathy.   Skin:     General: Skin is warm and dry.      Capillary Refill: Capillary refill takes less than 2 seconds.   Neurological:      General: No focal deficit present.      Mental Status: She is alert and oriented to person, place, and time.   Psychiatric:         Mood and Affect: Mood normal.         Behavior: Behavior normal.         Thought Content: Thought content normal.         Martha CHO  OB/GYN  2/18/2025  2:06 PM

## 2025-02-18 ENCOUNTER — ANNUAL EXAM (OUTPATIENT)
Dept: OBGYN CLINIC | Facility: CLINIC | Age: 45
End: 2025-02-18
Payer: COMMERCIAL

## 2025-02-18 VITALS — SYSTOLIC BLOOD PRESSURE: 120 MMHG | WEIGHT: 197.4 LBS | BODY MASS INDEX: 29.15 KG/M2 | DIASTOLIC BLOOD PRESSURE: 82 MMHG

## 2025-02-18 DIAGNOSIS — Z12.4 CERVICAL CANCER SCREENING: ICD-10-CM

## 2025-02-18 DIAGNOSIS — Z30.41 SURVEILLANCE FOR BIRTH CONTROL, ORAL CONTRACEPTIVES: ICD-10-CM

## 2025-02-18 DIAGNOSIS — Z01.419 WOMEN'S ANNUAL ROUTINE GYNECOLOGICAL EXAMINATION: Primary | ICD-10-CM

## 2025-02-18 DIAGNOSIS — Z12.31 ENCOUNTER FOR SCREENING MAMMOGRAM FOR MALIGNANT NEOPLASM OF BREAST: ICD-10-CM

## 2025-02-18 PROCEDURE — 99396 PREV VISIT EST AGE 40-64: CPT

## 2025-02-18 RX ORDER — TIRZEPATIDE 15 MG/.5ML
INJECTION, SOLUTION SUBCUTANEOUS
COMMUNITY
Start: 2025-02-06

## 2025-02-23 LAB
CYTOLOGIST CVX/VAG CYTO: NORMAL
DX ICD CODE: NORMAL
HPV GENOTYPE REFLEX: NORMAL
HPV I/H RISK 4 DNA CVX QL PROBE+SIG AMP: NEGATIVE
OTHER STN SPEC: NORMAL
PATH REPORT.FINAL DX SPEC: NORMAL
SL AMB NOTE:: NORMAL
SL AMB SPECIMEN ADEQUACY: NORMAL
SL AMB TEST METHODOLOGY: NORMAL

## 2025-02-24 ENCOUNTER — RESULTS FOLLOW-UP (OUTPATIENT)
Dept: OBGYN CLINIC | Facility: CLINIC | Age: 45
End: 2025-02-24

## 2025-05-01 DIAGNOSIS — N92.6 IRREGULAR MENSES: ICD-10-CM

## 2025-05-01 RX ORDER — NORETHINDRONE ACETATE AND ETHINYL ESTRADIOL, AND FERROUS FUMARATE 1MG-20(24)
1 KIT ORAL DAILY
Qty: 84 TABLET | Refills: 1 | Status: SHIPPED | OUTPATIENT
Start: 2025-05-01

## 2025-05-15 ENCOUNTER — HOSPITAL ENCOUNTER (OUTPATIENT)
Dept: RADIOLOGY | Facility: HOSPITAL | Age: 45
Discharge: HOME | End: 2025-05-15
Payer: COMMERCIAL

## 2025-05-15 VITALS — HEIGHT: 69 IN | WEIGHT: 197 LBS | BODY MASS INDEX: 29.18 KG/M2

## 2025-05-15 DIAGNOSIS — Z12.31 ENCOUNTER FOR SCREENING MAMMOGRAM FOR MALIGNANT NEOPLASM OF BREAST: ICD-10-CM

## 2025-05-15 PROCEDURE — 77063 BREAST TOMOSYNTHESIS BI: CPT

## 2025-05-15 PROCEDURE — 77067 SCR MAMMO BI INCL CAD: CPT
